# Patient Record
Sex: MALE | Race: OTHER | NOT HISPANIC OR LATINO | Employment: FULL TIME | URBAN - METROPOLITAN AREA
[De-identification: names, ages, dates, MRNs, and addresses within clinical notes are randomized per-mention and may not be internally consistent; named-entity substitution may affect disease eponyms.]

---

## 2023-03-13 LAB
AMPHETAMINE (PRESENCE) IN URINE BY SCREEN METHOD: ABNORMAL
BARBITURATES PRESENCE IN URINE BY SCREEN METHOD: ABNORMAL
BENZODIAZEPINE (PRESENCE) IN URINE BY SCREEN METHOD: ABNORMAL
CANNABINOIDS IN URINE BY SCREEN METHOD: ABNORMAL
COCAINE (PRESENCE) IN URINE BY SCREEN METHOD: ABNORMAL
DRUG SCREEN COMMENT URINE: ABNORMAL
FENTANYL URINE: ABNORMAL
METHADONE (PRESENCE) IN URINE BY SCREEN METHOD: ABNORMAL
OPIATES (PRESENCE) IN URINE BY SCREEN METHOD: ABNORMAL
OXYCODONE (PRESENCE) IN URINE BY SCREEN METHOD: ABNORMAL
PHENCYCLIDINE (PRESENCE) IN URINE BY SCREEN METHOD: ABNORMAL

## 2023-03-16 LAB
6-ACETYLMORPHINE: <25 NG/ML
CODEINE: 250 NG/ML
HYDROCODONE: <25 NG/ML
HYDROMORPHONE: <25 NG/ML
MORPHINE URINE: 92 NG/ML
NORHYDROCODONE: <25 NG/ML
NOROXYCODONE: <25 NG/ML
OXYCODONE: <25 NG/ML
OXYMORPHONE: <25 NG/ML

## 2023-11-07 RX ORDER — ARMODAFINIL 250 MG/1
250 TABLET ORAL DAILY
COMMUNITY
Start: 2013-09-19 | End: 2024-03-18 | Stop reason: SDUPTHER

## 2023-11-08 ENCOUNTER — PRE-ADMISSION TESTING (OUTPATIENT)
Dept: PREADMISSION TESTING | Facility: HOSPITAL | Age: 53
End: 2023-11-08
Payer: COMMERCIAL

## 2023-11-08 VITALS
SYSTOLIC BLOOD PRESSURE: 132 MMHG | WEIGHT: 257.5 LBS | HEIGHT: 71 IN | DIASTOLIC BLOOD PRESSURE: 59 MMHG | HEART RATE: 68 BPM | RESPIRATION RATE: 16 BRPM | TEMPERATURE: 97 F | BODY MASS INDEX: 36.05 KG/M2 | OXYGEN SATURATION: 96 %

## 2023-11-08 DIAGNOSIS — S83.232D COMPLEX TEAR OF MEDIAL MENISCUS OF LEFT KNEE AS CURRENT INJURY, SUBSEQUENT ENCOUNTER: ICD-10-CM

## 2023-11-08 DIAGNOSIS — Z01.818 PREOP TESTING: Primary | ICD-10-CM

## 2023-11-08 PROCEDURE — 99202 OFFICE O/P NEW SF 15 MIN: CPT | Performed by: NURSE PRACTITIONER

## 2023-11-08 ASSESSMENT — ENCOUNTER SYMPTOMS
EYES NEGATIVE: 1
GASTROINTESTINAL NEGATIVE: 1
CONSTITUTIONAL NEGATIVE: 1
PSYCHIATRIC NEGATIVE: 1
RESPIRATORY NEGATIVE: 1
ARTHRALGIAS: 1
CARDIOVASCULAR NEGATIVE: 1
NEUROLOGICAL NEGATIVE: 1

## 2023-11-08 ASSESSMENT — LIFESTYLE VARIABLES: SMOKING_STATUS: NONSMOKER

## 2023-11-08 ASSESSMENT — CHADS2 SCORE
CHF: NO
AGE GREATER THAN OR EQUAL TO 75: NO
HYPERTENSION: NO
DIABETES: NO
CHADS2 SCORE: 0
AGE GREATER THAN OR EQUAL TO 75: NO
PRIOR STROKE OR TIA OR THROMBOEMBOLISM: NO

## 2023-11-08 ASSESSMENT — PAIN SCALES - GENERAL: PAINLEVEL_OUTOF10: 0 - NO PAIN

## 2023-11-08 ASSESSMENT — ACTIVITIES OF DAILY LIVING (ADL): ADL_SCORE: 0

## 2023-11-08 ASSESSMENT — PAIN - FUNCTIONAL ASSESSMENT: PAIN_FUNCTIONAL_ASSESSMENT: 0-10

## 2023-11-08 NOTE — CPM/PAT H&P
CPM/PAT Evaluation       Name: Willian Hairston (Willian Hairston)  /Age: 1970/53 y.o.     In-Person       Chief Complaint: Left knee surgery    HPI Patient states he has left torn meniscus and has been having left knee swelling, lateral and posterior pain since March. Describes pain as constant. Denies cuts, bruises or wounds to left knee. Some left knee swelling.       Past Medical History:   Diagnosis Date    Narcolepsy without cataplexy     Narcolepsy    PONV (postoperative nausea and vomiting)     Always happens       Past Surgical History:   Procedure Laterality Date    COLONOSCOPY      HERNIA REPAIR      KNEE ARTHROPLASTY      Right    VARICOSE VEIN SURGERY  10/01/2013    Varicose Vein Ligation       Patient  reports being sexually active and has had partner(s) who are female.    Family History   Problem Relation Name Age of Onset    Other (pacemaker) Father      Other (degenerative disc disease) Father      Cancer Maternal Grandmother         Allergies   Allergen Reactions    Penicillins Unknown       Prior to Admission medications    Medication Sig Start Date End Date Taking? Authorizing Provider   armodafinil (Nuvigil) 250 mg tablet Take 1 tablet (250 mg) by mouth once daily. 13  Yes Historical Provider, MD        Review of Systems   Constitutional: Negative.    HENT: Negative.     Eyes: Negative.         Glasses   Respiratory: Negative.     Cardiovascular: Negative.    Gastrointestinal: Negative.    Genitourinary: Negative.    Musculoskeletal:  Positive for arthralgias.   Skin:         Has bandage and stitches to right middle finger. Patient states stitches will be removed before surgery.   Neurological: Negative.    Psychiatric/Behavioral: Negative.          Physical Exam  Constitutional:       Appearance: Normal appearance.   HENT:      Head: Normocephalic.   Eyes:      Extraocular Movements: Extraocular movements intact.   Cardiovascular:      Rate and Rhythm: Normal rate and regular rhythm.       Heart sounds: Normal heart sounds.   Pulmonary:      Effort: Pulmonary effort is normal.      Breath sounds: Normal breath sounds.   Abdominal:      General: Bowel sounds are normal.      Palpations: Abdomen is soft.   Musculoskeletal:         General: Normal range of motion.      Cervical back: Normal range of motion.   Skin:     General: Skin is warm and dry.   Neurological:      Mental Status: He is alert and oriented to person, place, and time.   Psychiatric:         Mood and Affect: Mood normal.          PAT AIRWAY:   Airway:     Neck ROM::  Full   Denies missing or loose teeth      Visit Vitals  /59   Pulse 68   Temp 36.1 °C (97 °F) (Temporal)   Resp 16       DASI Risk Score    No data to display       Caprini DVT Assessment      Flowsheet Row Most Recent Value   DVT Score 7   Current Status Major surgery planned, including arthroscopic and laproscopic (1-2 hours)   History Prior major surgery   Age 40-59 years   BMI 31-40 (Obesity)          Modified Frailty Index      Flowsheet Row Most Recent Value   Modified Frailty Index Calculator 0          CHADS2 Stroke Risk         N/A 3 - 100%: High Risk   2 - 3%: Medium Risk   0 - 2%: Low Risk     Last Change: N/A          This score determines the patient's risk of having a stroke if the patient has atrial fibrillation.        This score is not applicable to this patient. Components are not calculated.          Revised Cardiac Risk Index    No data to display       Apfel Simplified Score      Flowsheet Row Most Recent Value   Apfel Simplified Score Calculator 2          Risk Analysis Index Results This Encounter         11/8/2023  0800             M[facility]E Cancer History: Patient does not indicate history of cancer    Total Risk Analysis Index Score Without Cancer: 17    Total Risk Analysis Index Score: 17            Assessment and Plan:     53 year old for left knee meniscectomy arthroscopy 11/22/23

## 2023-11-08 NOTE — PREPROCEDURE INSTRUCTIONS
Medication List            Accurate as of November 8, 2023  8:13 AM. Always use your most recent med list.                armodafinil 250 mg tablet  Commonly known as: Nuvigil  Medication Adjustments for Surgery: Stop 7 days before surgery                      PRE-OPERATIVE INSTRUCTIONS    You will receive notification one business day prior to your surgery to confirm your arrival time and additional information. It is important that you answer your phone and/or check your messages during this time.    Please enter the building through the Outpatient entrance. Take the elevator off the lobby to the 2nd floor and check in at the Outpatient Surgery desk    INSTRUCTIONS:  Talk to your surgeon for instructions if you should stop your aspirin, blood thinner, or diabetes medicines.  DO NOT take any multivitamins or over the counter supplements for 7-10 days before surgery.  If not being admitted, you must have an adult immediately available to drive you home after surgery. We also highly recommend you have someone stay with you for the entire day and night of your surgery.  For children having surgery, a parent or legal guardian must accompany t hem to the surgery center. If this is not possible, please call [phone] to make additional arrangements.  For adults who are unable to consent or make medical decisions for themselves, a legal guardian or Power of  must accompany them to the surgery center. If this is not possible, please call [phone] to make additional arrangements.  Wear comfortable, loose fitting clothing.  All jewelry and piercings must be removed. If you are unable to remove an item or have a dermal piercing, please be sure to tell the nurse when you arrive for surgery.  Nail polish and make-up must be removed.  Avoid smoking or consuming alcohol for 24 hours before surgery.  To help prevent infection, please take a shower/bath and wash your hair the night before and/or morning of  surgery.    Additional instructions about eating and drinking before surgery:  Do not eat any solid foods or drink anything but clear liquids within 6 hours of your arrival time for surgery. Milk, nutritional drinks/supplements, and infant formula are considered solid foods.  You may drink up to 12 oz. of clear liquids up to 2 hours before your arrival time for surgery, unless directed otherwise by your surgeon. Clear liquids include water, non-carbonated sports drinks (Gatorade), black tea or coffee (no creamers) and breast milk.    If you received a blue folder, please review additional information provided inside the folder regarding additional preparation.     If you have any questions or concerns, please call Pre-Admission Testing at [phone].

## 2023-11-22 ENCOUNTER — ANESTHESIA EVENT (OUTPATIENT)
Dept: OPERATING ROOM | Facility: HOSPITAL | Age: 53
End: 2023-11-22
Payer: COMMERCIAL

## 2023-11-22 ENCOUNTER — HOSPITAL ENCOUNTER (OUTPATIENT)
Facility: HOSPITAL | Age: 53
Setting detail: OUTPATIENT SURGERY
Discharge: HOME | End: 2023-11-22
Attending: ORTHOPAEDIC SURGERY | Admitting: ORTHOPAEDIC SURGERY
Payer: COMMERCIAL

## 2023-11-22 ENCOUNTER — ANESTHESIA (OUTPATIENT)
Dept: OPERATING ROOM | Facility: HOSPITAL | Age: 53
End: 2023-11-22
Payer: COMMERCIAL

## 2023-11-22 VITALS
WEIGHT: 255 LBS | OXYGEN SATURATION: 96 % | HEART RATE: 70 BPM | RESPIRATION RATE: 16 BRPM | HEIGHT: 71 IN | BODY MASS INDEX: 35.7 KG/M2 | DIASTOLIC BLOOD PRESSURE: 76 MMHG | SYSTOLIC BLOOD PRESSURE: 132 MMHG | TEMPERATURE: 97.7 F

## 2023-11-22 DIAGNOSIS — M23.204 OLD PERIPHERAL TEAR OF MEDIAL MENISCUS OF LEFT KNEE: Primary | ICD-10-CM

## 2023-11-22 PROCEDURE — 7100000009 HC PHASE TWO TIME - INITIAL BASE CHARGE: Performed by: ORTHOPAEDIC SURGERY

## 2023-11-22 PROCEDURE — 2500000001 HC RX 250 WO HCPCS SELF ADMINISTERED DRUGS (ALT 637 FOR MEDICARE OP): Performed by: ANESTHESIOLOGY

## 2023-11-22 PROCEDURE — 2500000005 HC RX 250 GENERAL PHARMACY W/O HCPCS: Performed by: NURSE ANESTHETIST, CERTIFIED REGISTERED

## 2023-11-22 PROCEDURE — 3600000009 HC OR TIME - EACH INCREMENTAL 1 MINUTE - PROCEDURE LEVEL FOUR: Performed by: ORTHOPAEDIC SURGERY

## 2023-11-22 PROCEDURE — 7100000010 HC PHASE TWO TIME - EACH INCREMENTAL 1 MINUTE: Performed by: ORTHOPAEDIC SURGERY

## 2023-11-22 PROCEDURE — A29881 PR KNEE SCOPE,MED/LAT MENISECTOMY: Performed by: NURSE ANESTHETIST, CERTIFIED REGISTERED

## 2023-11-22 PROCEDURE — 3700000001 HC GENERAL ANESTHESIA TIME - INITIAL BASE CHARGE: Performed by: ORTHOPAEDIC SURGERY

## 2023-11-22 PROCEDURE — 2500000004 HC RX 250 GENERAL PHARMACY W/ HCPCS (ALT 636 FOR OP/ED): Performed by: ORTHOPAEDIC SURGERY

## 2023-11-22 PROCEDURE — 7100000002 HC RECOVERY ROOM TIME - EACH INCREMENTAL 1 MINUTE: Performed by: ORTHOPAEDIC SURGERY

## 2023-11-22 PROCEDURE — 3700000002 HC GENERAL ANESTHESIA TIME - EACH INCREMENTAL 1 MINUTE: Performed by: ORTHOPAEDIC SURGERY

## 2023-11-22 PROCEDURE — 2500000004 HC RX 250 GENERAL PHARMACY W/ HCPCS (ALT 636 FOR OP/ED): Performed by: NURSE ANESTHETIST, CERTIFIED REGISTERED

## 2023-11-22 PROCEDURE — 2500000005 HC RX 250 GENERAL PHARMACY W/O HCPCS: Performed by: ORTHOPAEDIC SURGERY

## 2023-11-22 PROCEDURE — 7100000001 HC RECOVERY ROOM TIME - INITIAL BASE CHARGE: Performed by: ORTHOPAEDIC SURGERY

## 2023-11-22 PROCEDURE — 3600000004 HC OR TIME - INITIAL BASE CHARGE - PROCEDURE LEVEL FOUR: Performed by: ORTHOPAEDIC SURGERY

## 2023-11-22 PROCEDURE — A29881 PR KNEE SCOPE,MED/LAT MENISECTOMY: Performed by: ANESTHESIOLOGY

## 2023-11-22 PROCEDURE — 2720000007 HC OR 272 NO HCPCS: Performed by: ORTHOPAEDIC SURGERY

## 2023-11-22 RX ORDER — ONDANSETRON HYDROCHLORIDE 2 MG/ML
4 INJECTION, SOLUTION INTRAVENOUS ONCE AS NEEDED
Status: DISCONTINUED | OUTPATIENT
Start: 2023-11-22 | End: 2023-11-27 | Stop reason: HOSPADM

## 2023-11-22 RX ORDER — MIDAZOLAM HYDROCHLORIDE 1 MG/ML
INJECTION, SOLUTION INTRAMUSCULAR; INTRAVENOUS AS NEEDED
Status: DISCONTINUED | OUTPATIENT
Start: 2023-11-22 | End: 2023-11-22

## 2023-11-22 RX ORDER — FAMOTIDINE 10 MG/ML
INJECTION INTRAVENOUS AS NEEDED
Status: DISCONTINUED | OUTPATIENT
Start: 2023-11-22 | End: 2023-11-22

## 2023-11-22 RX ORDER — ALBUTEROL SULFATE 0.83 MG/ML
2.5 SOLUTION RESPIRATORY (INHALATION) ONCE AS NEEDED
Status: DISCONTINUED | OUTPATIENT
Start: 2023-11-22 | End: 2023-11-27 | Stop reason: HOSPADM

## 2023-11-22 RX ORDER — HYDROMORPHONE HYDROCHLORIDE 1 MG/ML
1 INJECTION, SOLUTION INTRAMUSCULAR; INTRAVENOUS; SUBCUTANEOUS EVERY 5 MIN PRN
Status: DISCONTINUED | OUTPATIENT
Start: 2023-11-22 | End: 2023-11-27 | Stop reason: HOSPADM

## 2023-11-22 RX ORDER — FENTANYL CITRATE 50 UG/ML
INJECTION, SOLUTION INTRAMUSCULAR; INTRAVENOUS AS NEEDED
Status: DISCONTINUED | OUTPATIENT
Start: 2023-11-22 | End: 2023-11-22

## 2023-11-22 RX ORDER — KETOROLAC TROMETHAMINE 30 MG/ML
INJECTION, SOLUTION INTRAMUSCULAR; INTRAVENOUS AS NEEDED
Status: DISCONTINUED | OUTPATIENT
Start: 2023-11-22 | End: 2023-11-22

## 2023-11-22 RX ORDER — ACETAMINOPHEN 325 MG/1
650 TABLET ORAL EVERY 4 HOURS PRN
Status: DISCONTINUED | OUTPATIENT
Start: 2023-11-22 | End: 2023-11-27 | Stop reason: HOSPADM

## 2023-11-22 RX ORDER — MORPHINE SULFATE 0.5 MG/ML
INJECTION, SOLUTION EPIDURAL; INTRATHECAL; INTRAVENOUS AS NEEDED
Status: DISCONTINUED | OUTPATIENT
Start: 2023-11-22 | End: 2023-11-22 | Stop reason: HOSPADM

## 2023-11-22 RX ORDER — OXYCODONE HYDROCHLORIDE 10 MG/1
10 TABLET ORAL EVERY 4 HOURS PRN
Status: DISCONTINUED | OUTPATIENT
Start: 2023-11-22 | End: 2023-11-27 | Stop reason: HOSPADM

## 2023-11-22 RX ORDER — BUPIVACAINE HYDROCHLORIDE 2.5 MG/ML
INJECTION, SOLUTION INFILTRATION; PERINEURAL AS NEEDED
Status: DISCONTINUED | OUTPATIENT
Start: 2023-11-22 | End: 2023-11-22 | Stop reason: HOSPADM

## 2023-11-22 RX ORDER — ACETAMINOPHEN 325 MG/1
975 TABLET ORAL ONCE
Status: DISCONTINUED | OUTPATIENT
Start: 2023-11-22 | End: 2023-11-27 | Stop reason: HOSPADM

## 2023-11-22 RX ORDER — PROCHLORPERAZINE EDISYLATE 5 MG/ML
5 INJECTION INTRAMUSCULAR; INTRAVENOUS ONCE AS NEEDED
Status: DISCONTINUED | OUTPATIENT
Start: 2023-11-22 | End: 2023-11-27 | Stop reason: HOSPADM

## 2023-11-22 RX ORDER — SODIUM CHLORIDE, SODIUM LACTATE, POTASSIUM CHLORIDE, CALCIUM CHLORIDE 600; 310; 30; 20 MG/100ML; MG/100ML; MG/100ML; MG/100ML
100 INJECTION, SOLUTION INTRAVENOUS CONTINUOUS
Status: DISCONTINUED | OUTPATIENT
Start: 2023-11-22 | End: 2023-11-27 | Stop reason: HOSPADM

## 2023-11-22 RX ORDER — HYDROMORPHONE HYDROCHLORIDE 1 MG/ML
0.1 INJECTION, SOLUTION INTRAMUSCULAR; INTRAVENOUS; SUBCUTANEOUS EVERY 5 MIN PRN
Status: DISCONTINUED | OUTPATIENT
Start: 2023-11-22 | End: 2023-11-27 | Stop reason: HOSPADM

## 2023-11-22 RX ORDER — PROPOFOL 10 MG/ML
INJECTION, EMULSION INTRAVENOUS AS NEEDED
Status: DISCONTINUED | OUTPATIENT
Start: 2023-11-22 | End: 2023-11-22

## 2023-11-22 RX ORDER — PHENYLEPHRINE HCL IN 0.9% NACL 0.4MG/10ML
SYRINGE (ML) INTRAVENOUS AS NEEDED
Status: DISCONTINUED | OUTPATIENT
Start: 2023-11-22 | End: 2023-11-22

## 2023-11-22 RX ORDER — HYDROCODONE BITARTRATE AND ACETAMINOPHEN 5; 325 MG/1; MG/1
1 TABLET ORAL EVERY 6 HOURS PRN
Qty: 12 TABLET | Refills: 0 | Status: SHIPPED | OUTPATIENT
Start: 2023-11-22 | End: 2023-11-27

## 2023-11-22 RX ORDER — HYDRALAZINE HYDROCHLORIDE 20 MG/ML
5 INJECTION INTRAMUSCULAR; INTRAVENOUS EVERY 30 MIN PRN
Status: DISCONTINUED | OUTPATIENT
Start: 2023-11-22 | End: 2023-11-27 | Stop reason: HOSPADM

## 2023-11-22 RX ORDER — DIPHENHYDRAMINE HYDROCHLORIDE 50 MG/ML
INJECTION INTRAMUSCULAR; INTRAVENOUS AS NEEDED
Status: DISCONTINUED | OUTPATIENT
Start: 2023-11-22 | End: 2023-11-22

## 2023-11-22 RX ORDER — CEFAZOLIN SODIUM 2 G/100ML
INJECTION, SOLUTION INTRAVENOUS AS NEEDED
Status: DISCONTINUED | OUTPATIENT
Start: 2023-11-22 | End: 2023-11-22

## 2023-11-22 RX ORDER — MIDAZOLAM HYDROCHLORIDE 1 MG/ML
1 INJECTION, SOLUTION INTRAMUSCULAR; INTRAVENOUS ONCE AS NEEDED
Status: DISCONTINUED | OUTPATIENT
Start: 2023-11-22 | End: 2023-11-27 | Stop reason: HOSPADM

## 2023-11-22 RX ORDER — LIDOCAINE HYDROCHLORIDE 20 MG/ML
INJECTION, SOLUTION EPIDURAL; INFILTRATION; INTRACAUDAL; PERINEURAL AS NEEDED
Status: DISCONTINUED | OUTPATIENT
Start: 2023-11-22 | End: 2023-11-22

## 2023-11-22 RX ORDER — HYDROCODONE BITARTRATE AND ACETAMINOPHEN 5; 325 MG/1; MG/1
1 TABLET ORAL EVERY 4 HOURS PRN
Status: DISCONTINUED | OUTPATIENT
Start: 2023-11-22 | End: 2023-11-27 | Stop reason: HOSPADM

## 2023-11-22 RX ORDER — SCOLOPAMINE TRANSDERMAL SYSTEM 1 MG/1
PATCH, EXTENDED RELEASE TRANSDERMAL AS NEEDED
Status: DISCONTINUED | OUTPATIENT
Start: 2023-11-22 | End: 2023-11-22

## 2023-11-22 RX ORDER — HYDROMORPHONE HYDROCHLORIDE 1 MG/ML
0.5 INJECTION, SOLUTION INTRAMUSCULAR; INTRAVENOUS; SUBCUTANEOUS EVERY 5 MIN PRN
Status: DISCONTINUED | OUTPATIENT
Start: 2023-11-22 | End: 2023-11-27 | Stop reason: HOSPADM

## 2023-11-22 RX ORDER — LIDOCAINE HYDROCHLORIDE AND EPINEPHRINE 10; 10 MG/ML; UG/ML
INJECTION, SOLUTION INFILTRATION; PERINEURAL AS NEEDED
Status: DISCONTINUED | OUTPATIENT
Start: 2023-11-22 | End: 2023-11-22 | Stop reason: HOSPADM

## 2023-11-22 RX ADMIN — PROPOFOL 100 MG: 10 INJECTION, EMULSION INTRAVENOUS at 07:56

## 2023-11-22 RX ADMIN — PROPOFOL 200 MG: 10 INJECTION, EMULSION INTRAVENOUS at 07:54

## 2023-11-22 RX ADMIN — DIPHENHYDRAMINE HYDROCHLORIDE 50 MG: 50 INJECTION, SOLUTION INTRAMUSCULAR; INTRAVENOUS at 08:19

## 2023-11-22 RX ADMIN — Medication 200 MCG: at 08:00

## 2023-11-22 RX ADMIN — LIDOCAINE HYDROCHLORIDE 80 MG: 20 INJECTION, SOLUTION EPIDURAL; INFILTRATION; INTRACAUDAL; PERINEURAL at 07:54

## 2023-11-22 RX ADMIN — KETOROLAC TROMETHAMINE 30 MG: 30 INJECTION, SOLUTION INTRAMUSCULAR at 08:56

## 2023-11-22 RX ADMIN — Medication 100 MCG: at 08:19

## 2023-11-22 RX ADMIN — SODIUM CHLORIDE, SODIUM LACTATE, POTASSIUM CHLORIDE, AND CALCIUM CHLORIDE: 600; 310; 30; 20 INJECTION, SOLUTION INTRAVENOUS at 07:50

## 2023-11-22 RX ADMIN — MIDAZOLAM 2 MG: 1 INJECTION INTRAMUSCULAR; INTRAVENOUS at 07:50

## 2023-11-22 RX ADMIN — FENTANYL CITRATE 50 MCG: 50 INJECTION, SOLUTION INTRAMUSCULAR; INTRAVENOUS at 08:27

## 2023-11-22 RX ADMIN — CEFAZOLIN SODIUM 2 G: 2 INJECTION, SOLUTION INTRAVENOUS at 08:05

## 2023-11-22 RX ADMIN — Medication 200 MCG: at 08:49

## 2023-11-22 RX ADMIN — SCOLOPAMINE TRANSDERMAL SYSTEM 1 PATCH: 1 PATCH, EXTENDED RELEASE TRANSDERMAL at 07:50

## 2023-11-22 RX ADMIN — OXYCODONE HYDROCHLORIDE 10 MG: 10 TABLET ORAL at 10:39

## 2023-11-22 RX ADMIN — FAMOTIDINE 20 MG: 10 INJECTION, SOLUTION INTRAVENOUS at 08:19

## 2023-11-22 RX ADMIN — FENTANYL CITRATE 50 MCG: 50 INJECTION, SOLUTION INTRAMUSCULAR; INTRAVENOUS at 07:54

## 2023-11-22 SDOH — HEALTH STABILITY: MENTAL HEALTH: CURRENT SMOKER: 0

## 2023-11-22 ASSESSMENT — PAIN SCALES - GENERAL
PAINLEVEL_OUTOF10: 0 - NO PAIN
PAINLEVEL_OUTOF10: 2
PAINLEVEL_OUTOF10: 3
PAIN_LEVEL: 0
PAINLEVEL_OUTOF10: 3
PAINLEVEL_OUTOF10: 4
PAINLEVEL_OUTOF10: 3
PAINLEVEL_OUTOF10: 0 - NO PAIN

## 2023-11-22 ASSESSMENT — PAIN - FUNCTIONAL ASSESSMENT
PAIN_FUNCTIONAL_ASSESSMENT: 0-10

## 2023-11-22 NOTE — ANESTHESIA POSTPROCEDURE EVALUATION
Patient: Willian Hairston    Procedure Summary       Date: 11/22/23 Room / Location: [facility] OR  / [facility] OR    Anesthesia Start: 0750 Anesthesia Stop: 0922    Procedure: Meniscectomy Arthroscopy Knee (Left: Knee) Diagnosis:       Complex tear of medial meniscus of left knee as current injury, subsequent encounter      (Complex tear of medial meniscus of left knee as current injury, subsequent encounter [T71.612W])    Surgeons: Rubio Dubose MD Responsible Provider: Jorge Villagran MD    Anesthesia Type: general ASA Status: 2            Anesthesia Type: general    Vitals Value Taken Time   /69 11/22/23 1015   Temp 36 °C (96.8 °F) 11/22/23 1013   Pulse 71 11/22/23 1015   Resp 17 11/22/23 1015   SpO2 92 % 11/22/23 1015   Vitals shown include unvalidated device data.    Anesthesia Post Evaluation    Patient location during evaluation: PACU  Patient participation: complete - patient participated  Level of consciousness: sleepy but conscious  Pain score: 0  Pain management: adequate  Airway patency: patent  Two or more strategies used to mitigate risk of obstructive sleep apnea  Cardiovascular status: acceptable and hemodynamically stable  Respiratory status: acceptable, unassisted, spontaneous ventilation and nonlabored ventilation  Hydration status: balanced  Postoperative Nausea and Vomiting: none        There were no known notable events for this encounter.

## 2023-11-22 NOTE — OP NOTE
Meniscectomy Arthroscopy Knee (L) Operative Note     Date: 2023  OR Location: [facility] OR    Name: Willian Hairston, : 1970, Age: 53 y.o., MRN: [id number], Sex: male    Diagnosis  Pre-op Diagnosis     * Complex tear of medial meniscus of left knee as current injury, subsequent encounter [S83.232D] Post-op Diagnosis     * Complex tear of medial meniscus of left knee as current injury, subsequent encounter [S83.232D]     Procedures  Meniscectomy Arthroscopy Knee  35664 - SD ARTHRS KNE SURG W/MENISCECTOMY MED/LAT W/SHVG  Partial medial meniscectomy-posterior horn, partial lateral meniscectomy anterior    Surgeons      * Rubio Dubose - Primary    Resident/Fellow/Other Assistant:  Surgeon(s) and Role:    Procedure Summary  Anesthesia: General  ASA: II  Anesthesia Staff: Anesthesiologist: Jorge Villagran MD  CRNA: RODOLFO Allen-CRNA  Estimated Blood Loss: 10 mL  Intra-op Medications:   Medication Name Total Dose   lidocaine-epinephrine (Xylocaine W/EPI) 1 %-1:100,000 injection 20 mL   BUPivacaine HCl (Marcaine) 0.25 % (2.5 mg/mL) injection 5 mL   morphine PF (Duramorph) injection 5 mg              Anesthesia Record               Intraprocedure I/O Totals          Intake    LR 1000.00 mL    Propofol Drip 0.00 mL    The total shown is the total volume documented since Anesthesia Start was filed.    ceFAZolin in dextrose (iso-os) 2 gram/100 mL 100.00 mL    Total Intake 1100 mL       Output    Est. Blood Loss 10 mL    Total Output 10 mL       Net    Net Volume 1090 mL          Specimen: No specimens collected     Staff:   Circulator: Octavia Liz RN  Scrub Person: Arin Stein; Jessica Arredondo         Drains and/or Catheters: * None in log *    Tourniquet Times:         Implants:     Findings: Grade 3 degeneration involving a large component of the femoral condyle  Grade 2-3 degeneration present in the central portion of the trochlea, medial facet, and the medial facet of the  patella    Indications: Willian Hairston is an 53 y.o. male who is having surgery for Complex tear of medial meniscus of left knee as current injury, subsequent encounter [J15.911T].     The patient was seen in the preoperative area. The risks, benefits, complications, treatment options, non-operative alternatives, expected recovery and outcomes were discussed with the patient. The possibilities of reaction to medication, pulmonary aspiration, injury to surrounding structures, bleeding, recurrent infection, the need for additional procedures, failure to diagnose a condition, and creating a complication requiring transfusion or operation were discussed with the patient. The patient concurred with the proposed plan, giving informed consent.  The site of surgery was properly noted/marked if necessary per policy. The patient has been actively warmed in preoperative area. Preoperative antibiotics have been ordered and given within 1 hours of incision. Venous thrombosis prophylaxis have been ordered including unilateral sequential compression device    Procedure Details:   Indications for surgery  Patient is 53-year-old male who presents with discomfort, pain in his left knee.  Patient had difficulty mobilizing and clinical evaluation and an MRI scan pointed to a medial meniscal tear.  Patient also had areas of degeneration in the medial femoral condyle as well as in the patellofemoral joint  The patient had failed conservative measures of treatment, the risks and benefits of surgical intervention in the form of an arthroscopy partial medial meniscectomy chondroplasty have been discussed in detail  The risk of anesthesia, risk of injury to the vessel, nerve, tendon.  Postoperative complications such as hematoma formation, infection, continued pain in the knees, progression of the arthritis, hematoma formation within the knee, stiffness and discomfort in the knee requiring therapy have all been discussed.  Medical  complications have also been discussed.  The patient understands all the risks and benefits and consents for the surgical procedure    Operative note  Patient was brought to the operating room and general anesthesia was induced anesthetic services.  Patient had 2 g of Ancef given prior to the start of the procedure.  The left lower extremity was prepped and draped with chlorhexidine a thigh wright was placed a tourniquet was applied but was not elevated.  A timeout was called prior to the start of the procedure.    The knee was inflated with irrigating fluid.  The arthroscope was introduced through the lateral portal.  Visualization of the compartments was begun.  The patellofemoral joint was first visualized.  There was grade 2-3 degeneration noticed in the medial facet of the patella and in the central portion of the trochlea corresponding area of grade 2 through 3 degeneration was noted.  The patella was tracking well.  The medial lateral gutters were explored and no loose bodies were found.  Using the  number 4  shaver the areas of degeneration in the patella was carefully smoothed and out a small section of the chondral flap passed within the trochlea was also smoothed out.  An electrocautery was also used to cauterize some of the bleeding vessels in the suprapatella fossa.    Attention was now turned towards the medial compartment.  A complex tear was noticed in the posterior horn of the medial meniscus.  This involved a horizontal component involving the entire posterior horn this was noted to be unstable.  A probe was used to determine the extent of the tear.  This involved the entire posterior horn.  An upbiter was first used to trim the torn edges of the meniscus.  A 4 oh shaver was now used to resect the underside of the torn posterior horn of the medial meniscus.  Segments of the meniscus which were also torn with small radial tears at the midportion were also resected using the 4 O shaver approximately  20 % of the undersurface of the posterior horn of the medial meniscus had to be removed.  The probe was once again used to check the integrity of the meniscus the meniscus was probed throughout from anterior to posterior aspect there were no further loose fragments noted.  The intercondylar notch revealed an intact ACL.  The ACL was probed and noted to be intact.  The lateral compartment was examined next, the lateral meniscus was i noted to have small radial tears at anterior and midportion, and using the 4 oh shaver these were smoothed and out completely.    A probe was used once again the lateral meniscus and noted to be normal and intact there were no loose or torn fragments.  Lateral femoral condyle was also entirely normal.  The lateral tibial plateau was also normal..    The knee was then irrigated out completely and infiltrated with 5 mg of Duramorph and 5 mL of quarter percent Marcaine the arthroscopic portals were once again infiltrated with 1% lidocaine with epinephrine the skin was closed with Biosyn sutures Xeroform gauze web roll and an Ace wrap were applied.  The patient made a satisfactory recovery.  Postoperative instructions will be provided.  He will be evaluated in orthopedic office.  He will take over-the-counter anti-inflammatory medications.  The patient was provided a scipt for  Vicodin for his discomfort.     The role of the assistant was involved in positioning of the patient as well as placement of the leg wright and prepping of the left lower extremity.  During the surgical procedure, the assistant was involved and traction manage flexion of the knee, to visualize all the compartments.  Following completion of the procedure and she was involved in closure of the skin and soft tissues, application of the dressing, as well as moving the patient from the OR table to her regular bed.  There was no qualified resident available to assist in this case.      Complications:  None; patient  tolerated the procedure well.    Disposition: PACU - hemodynamically stable.  Condition: stable         Additional Details:     Attending Attestation: I was present and scrubbed for the key portions of the procedure.    Rubio Dubose  Phone Number: [phone]

## 2023-11-22 NOTE — ANESTHESIA PREPROCEDURE EVALUATION
Patient: Willian Hairston    Procedure Information       Date/Time: 11/22/23 0730    Procedure: Meniscectomy Arthroscopy Knee (Left: Knee)    Location: [facility] OR 07 / [facility] OR    Surgeons: Rubio Dubose MD            Relevant Problems   No relevant active problems       Clinical information reviewed:   Tobacco  Allergies  Meds  Problems  Med Hx  Surg Hx   Fam Hx  Soc   Hx        NPO Detail:  NPO/Void Status  Date of Last Liquid: 11/21/23  Time of Last Liquid: 2100  Date of Last Solid: 11/21/23  Time of Last Solid: 2100  Time of Last Void: 0500         Physical Exam    Airway  Mallampati: II  TM distance: >3 FB  Neck ROM: full     Cardiovascular - normal exam  Rhythm: regular  Rate: normal     Dental - normal exam     Pulmonary - normal exam  Breath sounds clear to auscultation     Abdominal   (+) obese  Abdomen: soft  Bowel sounds: normal       Anesthesia Plan    ASA 2     general     The patient is not a current smoker.  Patient was previously instructed to abstain from smoking on day of procedure.  Patient did not smoke on day of procedure.  Education provided regarding risk of obstructive sleep apnea.  intravenous induction   Postoperative administration of opioids is intended.  Anesthetic plan and risks discussed with patient.  Use of blood products discussed with patient who consented to blood products.    Plan discussed with CRNA.

## 2023-11-22 NOTE — ANESTHESIA PROCEDURE NOTES
Airway  Date/Time: 11/22/2023 7:56 AM  Urgency: elective    Airway not difficult    Staffing  Performed: CRNA   Authorized by: Jorge Villagran MD    Performed by: RODOLFO Allen-SILVIA  Patient location during procedure: OR    Indications and Patient Condition  Indications for airway management: anesthesia  Spontaneous ventilation: present  Sedation level: deep  Preoxygenated: yes  Patient position: sniffing    Planned trial extubation    Final Airway Details  Final airway type: supraglottic airway      Successful airway: Supraglottic airway: IGel.  Size 5     Number of attempts at approach: 1

## 2023-11-27 PROBLEM — D50.9 IRON DEFICIENCY ANEMIA: Status: ACTIVE | Noted: 2023-11-27

## 2023-11-27 PROBLEM — M22.40 CHONDROMALACIA OF PATELLA: Status: ACTIVE | Noted: 2023-05-16

## 2023-11-27 PROBLEM — G47.19 EXCESSIVE DAYTIME SLEEPINESS: Status: ACTIVE | Noted: 2023-11-27

## 2023-11-27 PROBLEM — K40.90 RIGHT INGUINAL HERNIA: Status: ACTIVE | Noted: 2023-11-27

## 2023-11-27 PROBLEM — R91.8 MULTIPLE NODULES OF LUNG: Status: ACTIVE | Noted: 2023-09-18

## 2023-11-27 PROBLEM — R53.83 FATIGUE: Status: ACTIVE | Noted: 2023-11-27

## 2023-11-27 PROBLEM — M23.90 DERANGEMENT OF KNEE: Status: ACTIVE | Noted: 2021-05-03

## 2023-11-27 PROBLEM — G47.419 NARCOLEPSY (HHS-HCC): Status: ACTIVE | Noted: 2022-06-06

## 2023-11-27 PROBLEM — S83.249A TEAR OF MEDIAL MENISCUS OF KNEE: Status: ACTIVE | Noted: 2021-09-13

## 2023-11-27 RX ORDER — FLUTICASONE PROPIONATE AND SALMETEROL 250; 50 UG/1; UG/1
POWDER RESPIRATORY (INHALATION)
COMMUNITY
Start: 2022-06-13

## 2023-11-27 RX ORDER — MELOXICAM 15 MG/1
TABLET ORAL
COMMUNITY
Start: 2021-06-02

## 2023-11-27 RX ORDER — AMMONIUM LACTATE 12 G/100G
LOTION TOPICAL
COMMUNITY

## 2023-11-27 RX ORDER — MODAFINIL 200 MG/1
200 TABLET ORAL
COMMUNITY
Start: 2012-07-30

## 2023-11-27 RX ORDER — IBUPROFEN 800 MG/1
TABLET ORAL
COMMUNITY

## 2023-11-27 RX ORDER — FERROUS SULFATE 325(65) MG
1 TABLET ORAL
COMMUNITY
Start: 2020-10-02

## 2023-11-27 RX ORDER — CEPHALEXIN 500 MG/1
500 CAPSULE ORAL 2 TIMES DAILY
COMMUNITY
Start: 2023-10-28

## 2023-11-27 RX ORDER — PREDNISONE 20 MG/1
TABLET ORAL
COMMUNITY

## 2023-11-27 RX ORDER — CELECOXIB 200 MG/1
1 CAPSULE ORAL EVERY 12 HOURS
COMMUNITY

## 2023-11-27 RX ORDER — DOXYCYCLINE 100 MG/1
CAPSULE ORAL
COMMUNITY

## 2023-12-20 ENCOUNTER — ANCILLARY PROCEDURE (OUTPATIENT)
Dept: RADIOLOGY | Facility: CLINIC | Age: 53
End: 2023-12-20
Payer: COMMERCIAL

## 2023-12-20 DIAGNOSIS — R91.8 OTHER NONSPECIFIC ABNORMAL FINDING OF LUNG FIELD: ICD-10-CM

## 2023-12-20 PROCEDURE — 71250 CT THORAX DX C-: CPT

## 2024-03-04 ENCOUNTER — APPOINTMENT (OUTPATIENT)
Dept: NEUROLOGY | Facility: CLINIC | Age: 54
End: 2024-03-04
Payer: COMMERCIAL

## 2024-03-11 ENCOUNTER — APPOINTMENT (OUTPATIENT)
Dept: NEUROLOGY | Facility: CLINIC | Age: 54
End: 2024-03-11
Payer: COMMERCIAL

## 2024-03-18 ENCOUNTER — OFFICE VISIT (OUTPATIENT)
Dept: NEUROLOGY | Facility: CLINIC | Age: 54
End: 2024-03-18
Payer: COMMERCIAL

## 2024-03-18 VITALS
WEIGHT: 261.6 LBS | HEIGHT: 71 IN | SYSTOLIC BLOOD PRESSURE: 120 MMHG | BODY MASS INDEX: 36.62 KG/M2 | HEART RATE: 81 BPM | DIASTOLIC BLOOD PRESSURE: 82 MMHG

## 2024-03-18 DIAGNOSIS — G47.19 EXCESSIVE DAYTIME SLEEPINESS: ICD-10-CM

## 2024-03-18 DIAGNOSIS — D50.8 IRON DEFICIENCY ANEMIA SECONDARY TO INADEQUATE DIETARY IRON INTAKE: ICD-10-CM

## 2024-03-18 DIAGNOSIS — G47.411: Primary | ICD-10-CM

## 2024-03-18 PROBLEM — M19.91 LOCALIZED, PRIMARY OSTEOARTHRITIS: Status: ACTIVE | Noted: 2023-11-27

## 2024-03-18 PROCEDURE — 1036F TOBACCO NON-USER: CPT | Performed by: PSYCHIATRY & NEUROLOGY

## 2024-03-18 PROCEDURE — 99214 OFFICE O/P EST MOD 30 MIN: CPT | Performed by: PSYCHIATRY & NEUROLOGY

## 2024-03-18 RX ORDER — ARMODAFINIL 250 MG/1
250 TABLET ORAL 2 TIMES DAILY
Qty: 60 TABLET | Refills: 5 | Status: SHIPPED | OUTPATIENT
Start: 2024-03-18

## 2024-03-18 RX ORDER — NIRMATRELVIR AND RITONAVIR 300-100 MG
KIT ORAL
COMMUNITY
Start: 2024-02-27

## 2024-03-18 ASSESSMENT — ENCOUNTER SYMPTOMS
SHORTNESS OF BREATH: 0
FACIAL ASYMMETRY: 0
JOINT SWELLING: 0
HALLUCINATIONS: 0
EYE PAIN: 0
SEIZURES: 0
NAUSEA: 0
FREQUENCY: 0
DIZZINESS: 0
BRUISES/BLEEDS EASILY: 0
TROUBLE SWALLOWING: 0
AGITATION: 0
NUMBNESS: 0
PALPITATIONS: 0
CONFUSION: 0
SLEEP DISTURBANCE: 0
VOMITING: 0
COUGH: 0
TREMORS: 0
FATIGUE: 0
ARTHRALGIAS: 0
SPEECH DIFFICULTY: 0
DIFFICULTY URINATING: 0
UNEXPECTED WEIGHT CHANGE: 0
BACK PAIN: 0
WEAKNESS: 0
NECK PAIN: 0
NECK STIFFNESS: 0
PHOTOPHOBIA: 0
SINUS PRESSURE: 0
HEADACHES: 0
ADENOPATHY: 0
FEVER: 0
HYPERACTIVE: 0
ABDOMINAL PAIN: 0
LIGHT-HEADEDNESS: 0
WHEEZING: 0

## 2024-03-18 ASSESSMENT — PATIENT HEALTH QUESTIONNAIRE - PHQ9
2. FEELING DOWN, DEPRESSED OR HOPELESS: NOT AT ALL
SUM OF ALL RESPONSES TO PHQ9 QUESTIONS 1 & 2: 0
1. LITTLE INTEREST OR PLEASURE IN DOING THINGS: NOT AT ALL

## 2024-03-18 NOTE — PROGRESS NOTES
Willian Marika  54 y.o.       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  Since last seen he is doing very well and when he has long hours he take a second dose in the afternoon and pretty much 5 days a week he has to take a second dose to stay awake he can tell a big difference when he is on medicine compared to off medicine.  No side effects of the medication today's physical and neurological examination was normal.  Like to continue his medicine that we staking and discussed the effect and the side of the medication importance of taking it regularly and the precautions to be taken and see him back in 6 months.    I did review the medication list.    I discussed the controlled substance policy, abusive potential, risk benefit and the precaution which she understood.      Due to technical limitations of voice recognition and human error, this note may not accurately reflect the care of the patient.    Review of Systems   Constitutional:  Negative for fatigue, fever and unexpected weight change.   HENT:  Negative for dental problem, ear pain, hearing loss, sinus pressure, tinnitus and trouble swallowing.    Eyes:  Negative for photophobia, pain and visual disturbance.   Respiratory:  Negative for cough, shortness of breath and wheezing.    Cardiovascular:  Negative for chest pain, palpitations and leg swelling.   Gastrointestinal:  Negative for abdominal pain, nausea and vomiting.   Genitourinary:  Negative for difficulty urinating, enuresis and frequency.   Musculoskeletal:  Negative for arthralgias, back pain, joint swelling, neck pain and neck stiffness.   Skin:  Negative for pallor and rash.   Allergic/Immunologic: Negative for food allergies.   Neurological:  Negative for dizziness, tremors, seizures, syncope, facial asymmetry, speech difficulty, weakness, light-headedness, numbness and headaches.   Hematological:  Negative for adenopathy.  "Does not bruise/bleed easily.   Psychiatric/Behavioral:  Negative for agitation, behavioral problems, confusion, hallucinations and sleep disturbance. The patient is not hyperactive.         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    Pain in limb    Right inguinal hernia    Sprain of interphalangeal joint of toe    Sprain, metatarsophalangeal joint    Tear of medial meniscus of knee    Localized, primary osteoarthritis     Past Medical History:   Diagnosis Date    Chondromalacia of patella 5/16/2023    Closed fracture of phalanx of foot 8/12/2014    Narcolepsy without cataplexy     Narcolepsy    PONV (postoperative nausea and vomiting)     Always happens    Right inguinal hernia 11/27/2023     Past Surgical History:   Procedure Laterality Date    COLONOSCOPY      HERNIA REPAIR      KNEE ARTHROPLASTY      Right    VARICOSE VEIN SURGERY  10/01/2013    Varicose Vein Ligation       reports that he has never smoked. He has never used smokeless tobacco. He reports current alcohol use of about 2.0 standard drinks of alcohol per week. He reports that he does not use drugs.    /82 (BP Location: Right arm, Patient Position: Sitting, BP Cuff Size: Large adult)   Pulse 81   Ht 1.803 m (5' 11\")   Wt 119 kg (261 lb 9.6 oz)   BMI 36.49 kg/m²     OBJECTIVE  Physical Exam/Neurological Exam   Constitutional: General appearance: no acute distress   Auscultation of Heart: Regular rate and rhythm, no murmurs, normal S1 and S2.   Carotid Arteries: Intact without any bruits.   Neck is supple.   No lymph adenopathy.   Peripheral Vascular Exam: Pulses +2 and equal in " all extremities. No swelling, varicosities, edema or tenderness to palpations.    Abdomen is soft, nondistended. No organomegaly.  Mental status: The patient was in no distress, alert, interactive and cooperative. Affect is appropriate.   Orientation: oriented to person, oriented to place and oriented to time.   Memory: recent memory intact and remote memory intact.   Attention: normal attention span and normal concentrating ability.   Language: normal comprehension and no difficulty naming common objects.   Fund of knowledge: Patient displays adequate knowledge of current events, adequate fund of knowledge regarding past history and adequate fund of knowledge regarding vocabulary.   Eyes: The ophthalmoscopic examination was normal. The fundi are visualized with normal disc margins and without.  Cranial nerve II: Visual fields full to confrontation.   Cranial nerves III, IV, and VI: Pupils round, equally reactive to light; no ptosis. EOMs intact. No nystagmus.   Cranial Nerve V: Facial sensation intact bilaterally.   Cranial nerve VII: Normal and symmetric facial strength.   Cranial nerve VIII: Hearing is intact bilaterally to finger rub / whisper.   Cranial nerves IX and X: Palate elevates symmetrically.   Cranial nerve XI: Shoulder shrug and neck rotation strength are intact.   Cranial nerve XII: Tongue midline with normal strength.   Motor: Motor exam was normal. Muscle bulk was normal in both upper and lower extremities. Muscle tone was normal in both upper and lower extremities. Muscle strength was 5/5 throughout. no abnormal or adventitious movements were present.   Deep Tendon Reflexes: left biceps 2+ , right biceps 2+, left triceps 2+, right triceps 2+, left brachioradialis 2+, right brachioradialis 2+, left patella 2+, right patella 2+, left ankle jerk 2+, right ankle jerk 2+   Plantar Reflex: Toes downgoing to plantar stimulation on the left. Toes downgoing to plantar stimulation on the right.   Sensory  Exam: Normal to light touch.   Coordination: There is no limb dystaxia and rapid alternating movements are intact.  Gait: Gait is normal without spasticity, ataxia or bradykinesia. Stance is stable with a negative Romberg.      ASSESSMENT/PLAN  Diagnoses and all orders for this visit:  Narcolepsy cataplexy syndrome  -     armodafinil (Nuvigil) 250 mg tablet; Take 1 tablet (250 mg) by mouth 2 times a day.  Excessive daytime sleepiness  Iron deficiency anemia secondary to inadequate dietary iron intake        Mil Saucedo MD  3/18/2024  12:39 PM

## 2024-08-21 PROBLEM — R03.0 BORDERLINE HIGH BLOOD PRESSURE: Status: ACTIVE | Noted: 2024-07-15

## 2024-08-21 PROBLEM — R91.8 LUNG NODULES: Status: ACTIVE | Noted: 2024-06-25

## 2024-08-21 PROBLEM — K40.90 LEFT INGUINAL HERNIA: Status: ACTIVE | Noted: 2024-07-15

## 2024-08-21 PROBLEM — E78.9 BORDERLINE HIGH CHOLESTEROL: Status: ACTIVE | Noted: 2024-07-15

## 2024-08-21 PROBLEM — E66.9 OBESITY, CLASS II, BMI 35-39.9: Status: ACTIVE | Noted: 2024-07-15

## 2024-08-21 PROBLEM — E66.812 OBESITY, CLASS II, BMI 35-39.9: Status: ACTIVE | Noted: 2024-07-15

## 2024-08-21 RX ORDER — ALBUTEROL SULFATE 90 UG/1
2 INHALANT RESPIRATORY (INHALATION) EVERY 6 HOURS PRN
COMMUNITY
Start: 2024-08-16

## 2024-08-26 ENCOUNTER — APPOINTMENT (OUTPATIENT)
Dept: NEUROLOGY | Facility: CLINIC | Age: 54
End: 2024-08-26
Payer: COMMERCIAL

## 2024-08-26 VITALS
SYSTOLIC BLOOD PRESSURE: 119 MMHG | DIASTOLIC BLOOD PRESSURE: 77 MMHG | WEIGHT: 254.6 LBS | HEART RATE: 75 BPM | BODY MASS INDEX: 35.64 KG/M2 | HEIGHT: 71 IN

## 2024-08-26 DIAGNOSIS — R53.82 CHRONIC FATIGUE: ICD-10-CM

## 2024-08-26 DIAGNOSIS — G47.411: ICD-10-CM

## 2024-08-26 DIAGNOSIS — G47.19 EXCESSIVE DAYTIME SLEEPINESS: Primary | ICD-10-CM

## 2024-08-26 PROCEDURE — 99214 OFFICE O/P EST MOD 30 MIN: CPT | Performed by: PSYCHIATRY & NEUROLOGY

## 2024-08-26 PROCEDURE — 3008F BODY MASS INDEX DOCD: CPT | Performed by: PSYCHIATRY & NEUROLOGY

## 2024-08-26 PROCEDURE — 1036F TOBACCO NON-USER: CPT | Performed by: PSYCHIATRY & NEUROLOGY

## 2024-08-26 RX ORDER — ARMODAFINIL 250 MG/1
250 TABLET ORAL 2 TIMES DAILY
Qty: 60 TABLET | Refills: 5 | Status: SHIPPED | OUTPATIENT
Start: 2024-08-26

## 2024-08-26 ASSESSMENT — ENCOUNTER SYMPTOMS
LIGHT-HEADEDNESS: 0
JOINT SWELLING: 0
EYE PAIN: 0
FACIAL ASYMMETRY: 0
SLEEP DISTURBANCE: 1
VOMITING: 0
NUMBNESS: 0
ABDOMINAL PAIN: 0
FREQUENCY: 0
BACK PAIN: 0
FEVER: 0
AGITATION: 0
DIZZINESS: 0
SEIZURES: 0
NECK PAIN: 0
NECK STIFFNESS: 0
HALLUCINATIONS: 0
PALPITATIONS: 0
ADENOPATHY: 0
TROUBLE SWALLOWING: 0
FATIGUE: 0
PHOTOPHOBIA: 0
UNEXPECTED WEIGHT CHANGE: 0
CONFUSION: 0
WEAKNESS: 0
BRUISES/BLEEDS EASILY: 0
HEADACHES: 0
WHEEZING: 0
COUGH: 0
NAUSEA: 0
SINUS PRESSURE: 0
NERVOUS/ANXIOUS: 1
TREMORS: 0
SPEECH DIFFICULTY: 0
SHORTNESS OF BREATH: 0
ARTHRALGIAS: 0
DIFFICULTY URINATING: 0
HYPERACTIVE: 0

## 2024-08-26 NOTE — PROGRESS NOTES
Willian Hairston  54 y.o.       SUBJECTIVE    HPI   Willian Hairston is a 54-year-old young man who was seen today for follow-up office narcolepsy with cataplexy.  Since last seen he has been doing very well on Nuvigil to 50 mg twice a day and when he misses a dose he can tell a big difference when he is on medicine compared off medicine.  Today's physical and neurological examination was normal.  I would like to continue his medicine the way he is taking and have discussed controlled substance policy, abuse potential, risk benefit and the precautions to be taken and depending on how he does I might make future recommendation when he comes back to see me in 6 months    I did review the medication list.      Due to technical limitations of voice recognition and human error, this note may not accurately reflect the care of the patient.    Review of Systems   Constitutional:  Negative for fatigue, fever and unexpected weight change.   HENT:  Negative for dental problem, ear pain, hearing loss, sinus pressure, tinnitus and trouble swallowing.    Eyes:  Negative for photophobia, pain and visual disturbance.   Respiratory:  Negative for cough, shortness of breath and wheezing.    Cardiovascular:  Negative for chest pain, palpitations and leg swelling.   Gastrointestinal:  Negative for abdominal pain, nausea and vomiting.   Genitourinary:  Negative for difficulty urinating, enuresis and frequency.   Musculoskeletal:  Negative for arthralgias, back pain, joint swelling, neck pain and neck stiffness.   Skin:  Negative for pallor and rash.   Allergic/Immunologic: Negative for food allergies.   Neurological:  Negative for dizziness, tremors, seizures, syncope, facial asymmetry, speech difficulty, weakness, light-headedness, numbness and headaches.   Hematological:  Negative for adenopathy. Does not bruise/bleed easily.   Psychiatric/Behavioral:  Positive for sleep disturbance. Negative for agitation, behavioral problems,  "confusion and hallucinations. The patient is nervous/anxious. The patient is not hyperactive.         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    Pain in limb    Right inguinal hernia    Sprain of interphalangeal joint of toe    Sprain, metatarsophalangeal joint    Tear of medial meniscus of knee    Localized, primary osteoarthritis    Borderline high blood pressure    Borderline high cholesterol    Obesity, Class II, BMI 35-39.9    Lung nodules    Left inguinal hernia     Past Medical History:   Diagnosis Date    Chondromalacia of patella 5/16/2023    Closed fracture of phalanx of foot 8/12/2014    Narcolepsy without cataplexy ([facility])     Narcolepsy    PONV (postoperative nausea and vomiting)     Always happens    Right inguinal hernia 11/27/2023     Past Surgical History:   Procedure Laterality Date    COLONOSCOPY      HERNIA REPAIR      KNEE ARTHROPLASTY      Right    VARICOSE VEIN SURGERY  10/01/2013    Varicose Vein Ligation       reports that he has never smoked. He has never used smokeless tobacco. He reports current alcohol use of about 2.0 standard drinks of alcohol per week. He reports that he does not use drugs.    /77 (BP Location: Left arm, Patient Position: Sitting, BP Cuff Size: Large adult)   Pulse 75   Ht 1.803 m (5' 11\")   Wt 115 kg (254 lb 9.6 oz)   BMI 35.51 kg/m²     OBJECTIVE  Physical Exam/Neurological Exam   Constitutional: General appearance: no acute distress   Auscultation of Heart: Regular rate and rhythm, no murmurs, normal S1 and S2.   Carotid Arteries: Intact without any bruits.   Neck is supple.   No " lymph adenopathy.   Peripheral Vascular Exam: Pulses +2 and equal in all extremities. No swelling, varicosities, edema or tenderness to palpations.    Abdomen is soft, nondistended. No organomegaly.  Mental status: The patient was in no distress, alert, interactive and cooperative. Affect is appropriate.   Orientation: oriented to person, oriented to place and oriented to time.   Memory: recent memory intact and remote memory intact.   Attention: normal attention span and normal concentrating ability.   Language: normal comprehension and no difficulty naming common objects.   Fund of knowledge: Patient displays adequate knowledge of current events, adequate fund of knowledge regarding past history and adequate fund of knowledge regarding vocabulary.   Eyes: The ophthalmoscopic examination was normal. The fundi are visualized with normal disc margins and without.  Cranial nerve II: Visual fields full to confrontation.   Cranial nerves III, IV, and VI: Pupils round, equally reactive to light; no ptosis. EOMs intact. No nystagmus.   Cranial Nerve V: Facial sensation intact bilaterally.   Cranial nerve VII: Normal and symmetric facial strength.   Cranial nerve VIII: Hearing is intact bilaterally to finger rub / whisper.   Cranial nerves IX and X: Palate elevates symmetrically.   Cranial nerve XI: Shoulder shrug and neck rotation strength are intact.   Cranial nerve XII: Tongue midline with normal strength.   Motor: Motor exam was normal. Muscle bulk was normal in both upper and lower extremities. Muscle tone was normal in both upper and lower extremities. Muscle strength was 5/5 throughout. no abnormal or adventitious movements were present.   Deep Tendon Reflexes: left biceps 2+ , right biceps 2+, left triceps 2+, right triceps 2+, left brachioradialis 2+, right brachioradialis 2+, left patella 2+, right patella 2+, left ankle jerk 2+, right ankle jerk 2+   Plantar Reflex: Toes downgoing to plantar stimulation on the  left. Toes downgoing to plantar stimulation on the right.   Sensory Exam: Normal to light touch.   Coordination: There is no limb dystaxia and rapid alternating movements are intact.  Gait: Gait is normal without spasticity, ataxia or bradykinesia. Stance is stable with a negative Romberg.      ASSESSMENT/PLAN  Diagnoses and all orders for this visit:  Excessive daytime sleepiness  Narcolepsy cataplexy syndrome ([facility])  -     armodafinil (Nuvigil) 250 mg tablet; Take 1 tablet (250 mg) by mouth 2 times a day.  Chronic fatigue        Mil Saucedo MD  8/26/2024  3:30 PM

## 2024-09-16 ENCOUNTER — APPOINTMENT (OUTPATIENT)
Dept: NEUROLOGY | Facility: CLINIC | Age: 54
End: 2024-09-16
Payer: COMMERCIAL

## 2025-02-17 ENCOUNTER — APPOINTMENT (OUTPATIENT)
Dept: NEUROLOGY | Facility: CLINIC | Age: 55
End: 2025-02-17
Payer: COMMERCIAL

## 2025-03-23 ENCOUNTER — OFFICE VISIT (OUTPATIENT)
Dept: URGENT CARE | Age: 55
End: 2025-03-23
Payer: COMMERCIAL

## 2025-03-23 VITALS
RESPIRATION RATE: 16 BRPM | OXYGEN SATURATION: 95 % | HEART RATE: 75 BPM | DIASTOLIC BLOOD PRESSURE: 81 MMHG | TEMPERATURE: 98 F | SYSTOLIC BLOOD PRESSURE: 143 MMHG | WEIGHT: 250 LBS | BODY MASS INDEX: 34.87 KG/M2

## 2025-03-23 DIAGNOSIS — R22.42 MASS OF SKIN OF LEFT LOWER LEG: Primary | ICD-10-CM

## 2025-03-23 PROCEDURE — 99203 OFFICE O/P NEW LOW 30 MIN: CPT | Performed by: NURSE PRACTITIONER

## 2025-03-23 PROCEDURE — 1036F TOBACCO NON-USER: CPT | Performed by: NURSE PRACTITIONER

## 2025-03-23 RX ORDER — CEPHALEXIN 500 MG/1
500 CAPSULE ORAL 4 TIMES DAILY
Qty: 14 CAPSULE | Refills: 0 | Status: SHIPPED | OUTPATIENT
Start: 2025-03-23 | End: 2025-03-30

## 2025-03-23 NOTE — PROGRESS NOTES
Subjective   Patient ID: Willian Hairston is a 55 y.o. male. They present today with a chief complaint of lump on left leg.    History of Present Illness  55 year old male presents for mass to left lower leg. + tenderness + erythema. Stiven injury. Hx of lipomas.           Past Medical History  Allergies as of 03/23/2025 - Reviewed 03/23/2025   Allergen Reaction Noted    Penicillins Unknown, Rash, and Other 10/28/2023       (Not in a hospital admission)       Past Medical History:   Diagnosis Date    Chondromalacia of patella 5/16/2023    Closed fracture of phalanx of foot 8/12/2014    Narcolepsy without cataplexy ([facility])     Narcolepsy    PONV (postoperative nausea and vomiting)     Always happens    Right inguinal hernia 11/27/2023       Past Surgical History:   Procedure Laterality Date    COLONOSCOPY      HERNIA REPAIR      KNEE ARTHROPLASTY      Right    VARICOSE VEIN SURGERY  10/01/2013    Varicose Vein Ligation        reports that he has never smoked. He has never used smokeless tobacco. He reports current alcohol use of about 2.0 standard drinks of alcohol per week. He reports that he does not use drugs.    Review of Systems  Review of Systems                               Objective    Vitals:    03/23/25 1013   BP: 143/81   Pulse: 75   Resp: 16   Temp: 36.7 °C (98 °F)   SpO2: 95%   Weight: 113 kg (250 lb)     No LMP for male patient.    Physical Exam    Procedures    Point of Care Test & Imaging Results from this visit  No results found for this visit on 03/23/25.   No results found.    Diagnostic study results (if any) were reviewed by MICHELLE Oliver.    Assessment/Plan   Allergies, medications, history, and pertinent labs/EKGs/Imaging reviewed by MICHELLE Oliver.     Medical Decision Making  Patient is afebrile, hemodynamically stable. Patient denies fever, n/v, cough, cp, sob, ab pain, dysuria, and diarrhea.   + tender, erythematous nodule to anterior left lower leg. + pulses.  Denies numbness/tingling. No calf pain. Will cover for bacterial component d/t erythema but advised to monitor closely. If symptoms persist or worsen will need further evaluation/imaging. Patient verbalized understanding. Discussed ER red flags in detail including s/s of DVT and when to seek ER eval.   Patient at time of discharge was clinically well-appearing and HDS for outpatient management. The patient was given the opportunity to ask questions prior to discharge, understood my verbal discussion of the plans for treatment, expected course, indications to return to ED, and the need for timely follow up as directed.       Condition: Stable     Disposition: Discharge      Orders and Diagnoses  Diagnoses and all orders for this visit:  Mass of skin of left lower leg  -     cephalexin (Keflex) 500 mg capsule; Take 1 capsule (500 mg) by mouth 4 times a day for 7 days.      Medical Admin Record      Patient disposition: Home    Electronically signed by MICHELLE Oliver  11:17 AM

## 2025-03-24 ENCOUNTER — HOSPITAL ENCOUNTER (OUTPATIENT)
Dept: CARDIOLOGY | Facility: HOSPITAL | Age: 55
Discharge: HOME | End: 2025-03-24
Payer: COMMERCIAL

## 2025-03-24 DIAGNOSIS — M79.605 LEFT LEG PAIN: ICD-10-CM

## 2025-03-24 DIAGNOSIS — R22.1 LOCALIZED SWELLING, MASS AND LUMP, NECK: ICD-10-CM

## 2025-03-24 PROCEDURE — 93971 EXTREMITY STUDY: CPT | Performed by: STUDENT IN AN ORGANIZED HEALTH CARE EDUCATION/TRAINING PROGRAM

## 2025-03-24 PROCEDURE — 93971 EXTREMITY STUDY: CPT

## 2025-03-27 ENCOUNTER — OFFICE VISIT (OUTPATIENT)
Dept: NEUROLOGY | Facility: CLINIC | Age: 55
End: 2025-03-27
Payer: COMMERCIAL

## 2025-03-27 VITALS
BODY MASS INDEX: 36.62 KG/M2 | HEIGHT: 71 IN | SYSTOLIC BLOOD PRESSURE: 144 MMHG | DIASTOLIC BLOOD PRESSURE: 87 MMHG | WEIGHT: 261.6 LBS | HEART RATE: 73 BPM

## 2025-03-27 DIAGNOSIS — G47.19 EXCESSIVE DAYTIME SLEEPINESS: ICD-10-CM

## 2025-03-27 DIAGNOSIS — R53.82 CHRONIC FATIGUE: ICD-10-CM

## 2025-03-27 DIAGNOSIS — G47.411: Primary | ICD-10-CM

## 2025-03-27 PROCEDURE — 99214 OFFICE O/P EST MOD 30 MIN: CPT | Performed by: PSYCHIATRY & NEUROLOGY

## 2025-03-27 PROCEDURE — 1036F TOBACCO NON-USER: CPT | Performed by: PSYCHIATRY & NEUROLOGY

## 2025-03-27 PROCEDURE — 3008F BODY MASS INDEX DOCD: CPT | Performed by: PSYCHIATRY & NEUROLOGY

## 2025-03-27 RX ORDER — ARMODAFINIL 250 MG/1
250 TABLET ORAL 2 TIMES DAILY
Qty: 60 TABLET | Refills: 5 | Status: SHIPPED | OUTPATIENT
Start: 2025-04-22

## 2025-03-27 ASSESSMENT — ENCOUNTER SYMPTOMS
NUMBNESS: 0
EYE PAIN: 0
FREQUENCY: 0
NAUSEA: 0
ADENOPATHY: 0
NERVOUS/ANXIOUS: 1
NECK PAIN: 0
SHORTNESS OF BREATH: 0
VOMITING: 0
TROUBLE SWALLOWING: 0
DIFFICULTY URINATING: 0
HEADACHES: 0
WHEEZING: 0
FACIAL ASYMMETRY: 0
TREMORS: 0
DIZZINESS: 0
SLEEP DISTURBANCE: 1
BRUISES/BLEEDS EASILY: 0
NECK STIFFNESS: 0
JOINT SWELLING: 0
CONFUSION: 0
AGITATION: 0
DEPRESSION: 0
COUGH: 0
HYPERACTIVE: 0
WEAKNESS: 0
SINUS PRESSURE: 0
SEIZURES: 0
ABDOMINAL PAIN: 0
SPEECH DIFFICULTY: 0
FATIGUE: 0
PHOTOPHOBIA: 0
OCCASIONAL FEELINGS OF UNSTEADINESS: 0
FEVER: 0
LIGHT-HEADEDNESS: 0
ARTHRALGIAS: 0
LOSS OF SENSATION IN FEET: 0
DECREASED CONCENTRATION: 1
BACK PAIN: 0
PALPITATIONS: 0
UNEXPECTED WEIGHT CHANGE: 0
HALLUCINATIONS: 0

## 2025-03-27 ASSESSMENT — PATIENT HEALTH QUESTIONNAIRE - PHQ9
1. LITTLE INTEREST OR PLEASURE IN DOING THINGS: NOT AT ALL
2. FEELING DOWN, DEPRESSED OR HOPELESS: NOT AT ALL
SUM OF ALL RESPONSES TO PHQ9 QUESTIONS 1 & 2: 0

## 2025-03-27 NOTE — PROGRESS NOTES
Willian Marika  55 y.o.       SUBJECTIVE  Willian is a 53-year-old young man who was seen today for follow-up of his encephalopathy due to narcolepsy cataplexy.  Since last seen he has been doing very well on Nuvigil to 50 mg 2 times a day and can tell a big difference when he is on medicine compared off medicine.  No side effects from the medication.  Please physical and neurological examination was normal.  I would like to continue his medicine the way he is taking and have discussed the controlled substance policy, abuse potential, risk benefit and the precautions to be taken and depending on how he does I might make future recommendation when he comes back to see me in 3 months.    I did review the medication list.      Due to technical limitations of voice recognition and human error, this note may not accurately reflect the care of the patient.    Review of Systems   Constitutional:  Negative for fatigue, fever and unexpected weight change.   HENT:  Negative for dental problem, ear pain, hearing loss, sinus pressure, tinnitus and trouble swallowing.    Eyes:  Negative for photophobia, pain and visual disturbance.   Respiratory:  Negative for cough, shortness of breath and wheezing.    Cardiovascular:  Negative for chest pain, palpitations and leg swelling.   Gastrointestinal:  Negative for abdominal pain, nausea and vomiting.   Genitourinary:  Negative for difficulty urinating, enuresis and frequency.   Musculoskeletal:  Negative for arthralgias, back pain, joint swelling, neck pain and neck stiffness.   Skin:  Negative for pallor and rash.   Allergic/Immunologic: Negative for food allergies.   Neurological:  Negative for dizziness, tremors, seizures, syncope, facial asymmetry, speech difficulty, weakness, light-headedness, numbness and headaches.   Hematological:  Negative for adenopathy. Does not bruise/bleed easily.   Psychiatric/Behavioral:  Positive for decreased concentration and sleep disturbance. Negative  "for agitation, behavioral problems, confusion and hallucinations. The patient is nervous/anxious. The patient is not hyperactive.         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    Pain in limb    Right inguinal hernia    Sprain of interphalangeal joint of toe    Sprain, metatarsophalangeal joint    Tear of medial meniscus of knee    Localized, primary osteoarthritis    Borderline high blood pressure    Borderline high cholesterol    Obesity, Class II, BMI 35-39.9    Lung nodules    Left inguinal hernia     Past Medical History:   Diagnosis Date    Chondromalacia of patella 5/16/2023    Closed fracture of phalanx of foot 8/12/2014    Narcolepsy without cataplexy ([facility])     Narcolepsy    PONV (postoperative nausea and vomiting)     Always happens    Right inguinal hernia 11/27/2023     Past Surgical History:   Procedure Laterality Date    COLONOSCOPY      HERNIA REPAIR      KNEE ARTHROPLASTY      Right    VARICOSE VEIN SURGERY  10/01/2013    Varicose Vein Ligation       reports that he has never smoked. He has never used smokeless tobacco. He reports current alcohol use of about 2.0 standard drinks of alcohol per week. He reports that he does not use drugs.    /87 (BP Location: Right arm, Patient Position: Sitting, BP Cuff Size: Large adult)   Pulse 73   Ht 1.803 m (5' 11\")   Wt 119 kg (261 lb 9.6 oz)   BMI 36.49 kg/m²     OBJECTIVE  Physical Exam/Neurological Exam   Constitutional: General appearance: no acute distress   Auscultation of Heart: Regular rate and rhythm, no murmurs, normal S1 and S2.   Carotid Arteries: Intact without any " bruits.   Neck is supple.   No lymph adenopathy.   Peripheral Vascular Exam: Pulses +2 and equal in all extremities. No swelling, varicosities, edema or tenderness to palpations.    Abdomen is soft, nondistended. No organomegaly.  Mental status: The patient was in no distress, alert, interactive and cooperative. Affect is appropriate.   Orientation: oriented to person, oriented to place and oriented to time.   Memory: recent memory intact and remote memory intact.   Attention: normal attention span and normal concentrating ability.   Language: normal comprehension and no difficulty naming common objects.   Fund of knowledge: Patient displays adequate knowledge of current events, adequate fund of knowledge regarding past history and adequate fund of knowledge regarding vocabulary.   Eyes: The ophthalmoscopic examination was normal. The fundi are visualized with normal disc margins and without.  Cranial nerve II: Visual fields full to confrontation.   Cranial nerves III, IV, and VI: Pupils round, equally reactive to light; no ptosis. EOMs intact. No nystagmus.   Cranial Nerve V: Facial sensation intact bilaterally.   Cranial nerve VII: Normal and symmetric facial strength.   Cranial nerve VIII: Hearing is intact bilaterally to finger rub / whisper.   Cranial nerves IX and X: Palate elevates symmetrically.   Cranial nerve XI: Shoulder shrug and neck rotation strength are intact.   Cranial nerve XII: Tongue midline with normal strength.   Motor: Motor exam was normal. Muscle bulk was normal in both upper and lower extremities. Muscle tone was normal in both upper and lower extremities. Muscle strength was 5/5 throughout. no abnormal or adventitious movements were present.   Deep Tendon Reflexes: left biceps 2+ , right biceps 2+, left triceps 2+, right triceps 2+, left brachioradialis 2+, right brachioradialis 2+, left patella 2+, right patella 2+, left ankle jerk 2+, right ankle jerk 2+   Plantar Reflex: Toes downgoing  to plantar stimulation on the left. Toes downgoing to plantar stimulation on the right.   Sensory Exam: Normal to light touch.   Coordination: There is no limb dystaxia and rapid alternating movements are intact.  Gait: Gait is normal without spasticity, ataxia or bradykinesia. Stance is stable with a negative Romberg.      ASSESSMENT/PLAN  Diagnoses and all orders for this visit:  Narcolepsy cataplexy syndrome ([facility])  -     armodafinil (Nuvigil) 250 mg tablet; Take 1 tablet (250 mg) by mouth 2 times a day. Do not fill before April 22, 2025.  Excessive daytime sleepiness  Chronic fatigue        Mil Saucedo MD  3/27/2025  12:22 PM

## 2025-09-09 ENCOUNTER — APPOINTMENT (OUTPATIENT)
Dept: NEUROLOGY | Facility: CLINIC | Age: 55
End: 2025-09-09
Payer: COMMERCIAL

## 2025-09-17 ENCOUNTER — APPOINTMENT (OUTPATIENT)
Dept: NEUROLOGY | Facility: CLINIC | Age: 55
End: 2025-09-17
Payer: COMMERCIAL

## (undated) DEVICE — SOLUTION, IRRIGATION, STERILE WATER, 1000 ML, POUR BOTTLE

## (undated) DEVICE — STRIP, SKIN CLOSURE, STERI STRIP, REINFORCED, 0.5 X 4 IN

## (undated) DEVICE — SOLUTION, IRRIGATION, SODIUM CHLORIDE 0.9%, 1000 ML, POUR BOTTLE

## (undated) DEVICE — TUBESET, INTERMEDIARY

## (undated) DEVICE — Device

## (undated) DEVICE — GLOVE, SURGICAL, PROTEXIS PI MICRO, 8.0, PF, LF

## (undated) DEVICE — SUTURE, MONOCRYL, 3-0, 18 IN, PS2, UNDYED

## (undated) DEVICE — ADHESIVE, SKIN, MASTISOL, 2/3 CC VIAL

## (undated) DEVICE — NEEDLE, HYPODERMIC, REGULAR WALL, REGULAR BEVEL, 22 G X 1.5 IN

## (undated) DEVICE — GLOVE, SURGICAL, PROTEXIS PI BLUE W/NEUTHERA, 8.0, PF, LF

## (undated) DEVICE — BLADE, STRYKER, 4.0MM, AGG PLUS SHVBLD ULTMT

## (undated) DEVICE — TUBING, PUMP MAIN 16FT STERILE

## (undated) DEVICE — BANDAGE, ESMARK, 6 IN X 12 FT

## (undated) DEVICE — APPLICATOR, CHLORAPREP, W/ORANGE TINT, 26ML

## (undated) DEVICE — SYRINGE, 50 CC, LUER LOCK

## (undated) DEVICE — DRESSING, GAUZE, PETROLATUM, VASELINE, 3 X 9 IN, STERILE

## (undated) DEVICE — BANDAGE, COFLEX, 6 X 5 YDS, FOAM TAN, STERILE, LF

## (undated) DEVICE — TOWEL PACK, STERILE, 4/PACK, BLUE